# Patient Record
Sex: MALE | Race: OTHER | HISPANIC OR LATINO
[De-identification: names, ages, dates, MRNs, and addresses within clinical notes are randomized per-mention and may not be internally consistent; named-entity substitution may affect disease eponyms.]

---

## 2023-01-06 ENCOUNTER — APPOINTMENT (OUTPATIENT)
Dept: UROLOGY | Facility: CLINIC | Age: 23
End: 2023-01-06
Payer: MEDICAID

## 2023-01-06 VITALS
HEIGHT: 67 IN | HEART RATE: 65 BPM | DIASTOLIC BLOOD PRESSURE: 80 MMHG | BODY MASS INDEX: 26.68 KG/M2 | WEIGHT: 170 LBS | SYSTOLIC BLOOD PRESSURE: 144 MMHG

## 2023-01-06 DIAGNOSIS — F52.4 PREMATURE EJACULATION: ICD-10-CM

## 2023-01-06 DIAGNOSIS — Z78.9 OTHER SPECIFIED HEALTH STATUS: ICD-10-CM

## 2023-01-06 PROBLEM — Z00.00 ENCOUNTER FOR PREVENTIVE HEALTH EXAMINATION: Status: ACTIVE | Noted: 2023-01-06

## 2023-01-06 PROCEDURE — 99203 OFFICE O/P NEW LOW 30 MIN: CPT

## 2023-01-07 NOTE — REASON FOR VISIT
[Initial Visit ___] : [unfilled] is here today for an initial visit  for [unfilled]
Temp > 100F or < 96.8F; SBP < 90 mmHG; HR > 120bpm; Resp > 24/min

## 2023-01-07 NOTE — ASSESSMENT
[FreeTextEntry1] : 21yo male with 1-2 month history of premature ejaculation\par Discussed FDA approved antidepressants for PE\par He is not interested in medications\par Recommend lidocaine spray prn \par Handout given\par Reassurance given\par F/u prn

## 2023-01-07 NOTE — HISTORY OF PRESENT ILLNESS
[FreeTextEntry1] : This is a healthy 23yo male here for chief complaint of premature ejaculation. Onset was recent, last 1-2 months. Upsetting to patient and his partner. His partner is pregnant, expecting first child in 1 month. He has no issues with erectile function or libido. He has tried condoms with no improvement.  [None] : None